# Patient Record
Sex: FEMALE | Race: WHITE | NOT HISPANIC OR LATINO | URBAN - METROPOLITAN AREA
[De-identification: names, ages, dates, MRNs, and addresses within clinical notes are randomized per-mention and may not be internally consistent; named-entity substitution may affect disease eponyms.]

---

## 2024-05-23 ENCOUNTER — OFFICE (OUTPATIENT)
Dept: URBAN - METROPOLITAN AREA CLINIC 13 | Facility: CLINIC | Age: 44
End: 2024-05-23
Payer: COMMERCIAL

## 2024-05-23 VITALS
WEIGHT: 190 LBS | HEIGHT: 63 IN | SYSTOLIC BLOOD PRESSURE: 122 MMHG | DIASTOLIC BLOOD PRESSURE: 76 MMHG | HEART RATE: 82 BPM

## 2024-05-23 DIAGNOSIS — K21.9 GASTRO-ESOPHAGEAL REFLUX DISEASE WITHOUT ESOPHAGITIS: ICD-10-CM

## 2024-05-23 PROCEDURE — 99204 OFFICE O/P NEW MOD 45 MIN: CPT | Performed by: INTERNAL MEDICINE

## 2024-05-23 NOTE — SERVICENOTES
Patient and I discussed acid reflux lifestyle modifications including avoiding alcohol and cigarettes.  We also discussed avoiding eating late at night and avoid eating large meals.  We discussed the fact that high fat foods, spicy foods, tomato-based food items, red wine, regular cola and laying down soon after eating can worsen acid reflux.  We discussed elevating the head of the bed by about 6 inches to aid in any nighttime regurgitation symptoms.  We discussed the need to avoid diarrhea provoking foods such as milk and ice cream, that is lactose, as well as high fructose corn syrup such as regular soda and sweet tea.  We also discussed the need to avoid high fat food items as well as high amounts of caffeine and highly spiced items which sometimes contribute to diarrhea.

## 2024-05-23 NOTE — SERVICEHPINOTES
Rhina Daugherty   is a   43   year old   female   patient who is seen   at the request of   Iker Camejo   for a consultation/initial visit.  G gastro/[facility]/referral data is reviewed prior to office visit/consultation. Past medical history, medications, surgical history family history and social history are reviewed on this visit.Many year history of GERD.No dysphagia. She is having some nocturnal regurgitation symptomatology lately.She notes about a 20 pound weight gain over the last 2 years or so.She is 40 mg omeprazole.Patient is following diet avoiding typical reflux producing foods. He has had some choking episodes with the nocturnal regurgitation.Patient states 2 bowel movements a day,Soft "Mushy"No blood noted.This is been going on for 8 years. Check CBC CMP CRP fecal calprotectin. Additionally check celiac markers and thyroid functions.